# Patient Record
Sex: MALE | Race: BLACK OR AFRICAN AMERICAN | Employment: UNEMPLOYED | URBAN - METROPOLITAN AREA
[De-identification: names, ages, dates, MRNs, and addresses within clinical notes are randomized per-mention and may not be internally consistent; named-entity substitution may affect disease eponyms.]

---

## 2020-12-31 ENCOUNTER — HOSPITAL ENCOUNTER (EMERGENCY)
Facility: HOSPITAL | Age: 40
Discharge: HOME | End: 2020-12-31
Attending: EMERGENCY MEDICINE

## 2020-12-31 VITALS
HEIGHT: 69 IN | DIASTOLIC BLOOD PRESSURE: 89 MMHG | OXYGEN SATURATION: 99 % | SYSTOLIC BLOOD PRESSURE: 134 MMHG | WEIGHT: 215 LBS | HEART RATE: 68 BPM | TEMPERATURE: 99 F | BODY MASS INDEX: 31.84 KG/M2 | RESPIRATION RATE: 18 BRPM

## 2020-12-31 DIAGNOSIS — K04.7 DENTAL INFECTION: Primary | ICD-10-CM

## 2020-12-31 PROCEDURE — 99283 EMERGENCY DEPT VISIT LOW MDM: CPT

## 2020-12-31 RX ORDER — AMLODIPINE BESYLATE 10 MG/1
10 TABLET ORAL DAILY
COMMUNITY

## 2020-12-31 RX ORDER — BENAZEPRIL HYDROCHLORIDE 10 MG/1
10 TABLET ORAL DAILY
COMMUNITY

## 2020-12-31 RX ORDER — ACETAMINOPHEN AND CODEINE PHOSPHATE 300; 30 MG/1; MG/1
1-2 TABLET ORAL EVERY 6 HOURS PRN
Qty: 10 TABLET | Refills: 0 | Status: SHIPPED | OUTPATIENT
Start: 2020-12-31 | End: 2021-01-07

## 2020-12-31 RX ORDER — PENICILLIN V POTASSIUM 500 MG/1
500 TABLET ORAL 2 TIMES DAILY
Qty: 14 TABLET | Refills: 0 | Status: SHIPPED | OUTPATIENT
Start: 2020-12-31 | End: 2021-01-07

## 2020-12-31 RX ORDER — CHLORHEXIDINE GLUCONATE 0.12 MG/ML
15 RINSE ORAL 2 TIMES DAILY
Qty: 210 ML | Refills: 0 | Status: SHIPPED | OUTPATIENT
Start: 2020-12-31 | End: 2021-01-07

## 2020-12-31 NOTE — ED PROVIDER NOTES
Patient Seen in: [facility] Emergency Department      History   Patient presents with:  Ear Problem Pain  Pain    Stated Complaint: Right sided lymph node enlargement w/ ear pain and right facial pain     HPI    66-year-old male presents with 1 week uncomfortable  HEENT: Tenderness along the right side of the mandible. Mild swelling to the area. Multiple reactive lymph nodes in the area. There is tenderness on palpation and percussion of the first and second lower molar on the left side.   No obviou v potassium 500 MG Oral Tab  Take 1 tablet (500 mg total) by mouth 2 (two) times a day for 7 days.   Qty: 14 tablet Refills: 0    Chlorhexidine Gluconate 0.12 % Mouth/Throat Solution  Use as directed 15 mL in the mouth or throat 2 (two) times daily for 7 da

## 2020-12-31 NOTE — ED INITIAL ASSESSMENT (HPI)
Right lymph node swelling since 12/22, worsening right sided facial pain/ ear pain. Possible right sided tooth abscess. Deny fever. Unable to sleep.